# Patient Record
Sex: MALE | Race: WHITE | NOT HISPANIC OR LATINO | Employment: FULL TIME | URBAN - METROPOLITAN AREA
[De-identification: names, ages, dates, MRNs, and addresses within clinical notes are randomized per-mention and may not be internally consistent; named-entity substitution may affect disease eponyms.]

---

## 2024-02-29 ENCOUNTER — OFFICE VISIT (OUTPATIENT)
Dept: URGENT CARE | Facility: CLINIC | Age: 34
End: 2024-02-29
Payer: COMMERCIAL

## 2024-02-29 VITALS
SYSTOLIC BLOOD PRESSURE: 129 MMHG | RESPIRATION RATE: 14 BRPM | DIASTOLIC BLOOD PRESSURE: 97 MMHG | OXYGEN SATURATION: 99 % | TEMPERATURE: 98.6 F | HEART RATE: 106 BPM

## 2024-02-29 DIAGNOSIS — B34.9 NONSPECIFIC SYNDROME SUGGESTIVE OF VIRAL ILLNESS: Primary | ICD-10-CM

## 2024-02-29 DIAGNOSIS — J10.1 INFLUENZA B: ICD-10-CM

## 2024-02-29 LAB
POC TRIPLEX FLU A-AG: ABNORMAL
POC TRIPLEX FLU B-AG: ABNORMAL
POC TRIPLEX SARSCOV-2 AG: ABNORMAL

## 2024-02-29 PROCEDURE — 87428 SARSCOV & INF VIR A&B AG IA: CPT | Performed by: PHYSICIAN ASSISTANT

## 2024-02-29 PROCEDURE — 99212 OFFICE O/P EST SF 10 MIN: CPT | Performed by: PHYSICIAN ASSISTANT

## 2024-02-29 RX ORDER — DEXTROMETHORPHAN HYDROBROMIDE, GUAIFENESIN AND PSEUDOEPHEDRINE HYDROCHLORIDE 15; 400; 60 MG/1; MG/1; MG/1
1 TABLET ORAL 3 TIMES DAILY
Qty: 21 TABLET | Refills: 0 | Status: SHIPPED | OUTPATIENT
Start: 2024-02-29 | End: 2024-03-07

## 2024-02-29 RX ORDER — OSELTAMIVIR PHOSPHATE 75 MG/1
75 CAPSULE ORAL 2 TIMES DAILY
Qty: 20 CAPSULE | Refills: 0 | Status: SHIPPED | OUTPATIENT
Start: 2024-02-29 | End: 2024-03-10

## 2024-02-29 NOTE — LETTER
February 29, 2024     Patient: Mikey Sarabia   YOB: 1990   Date of Visit: 2/29/2024       To Whom It May Concern:    It is my medical opinion that Mikey Sarabia may return to work on 03/04/24 .    If you have any questions or concerns, please don't hesitate to call.         Sincerely,        Alex Sun PA-C    CC: No Recipients   Never

## 2024-02-29 NOTE — PROGRESS NOTES
[facility] URGENT CARE [facility] NOTE:      Name: Mikey Sarabia, 33 y.o.    CSN:[id number]   MRN:[id number]    PCP: No primary care provider on file.    ALL:  No Known Allergies    History:    Chief Complaint: Nasal Congestion and HEAD PRESSURE (EARS PLUGGED X 3 DAYS)    Encounter Date: 2/29/2024  1250    HPI: The history was obtained from the patient. Mikey is a 33 y.o. male, who presents with a chief complaint of Nasal Congestion and HEAD PRESSURE (EARS PLUGGED X 3 DAYS) Patient reports nasal congesiton, post nasal drip, bilateral ear pressure/pain, generalized myalgias/arthralgias x 3 days. He also reports subjective fever/chills, diaphoresis, and decreased appetite resulting in a 7 lb weight loss in the last 3 days. No N/V/D. He denies any dyspnea or hemoptysis.       PMHx:    History reviewed. No pertinent past medical history.           Current Outpatient Medications   Medication Sig Dispense Refill    oseltamivir (Tamiflu) 75 mg capsule Take 1 capsule (75 mg) by mouth 2 times a day for 10 days. 20 capsule 0    pseudoephedrine-DM-guaifenesin (Capmist DM) 60- mg tablet Take 1 tablet by mouth 3 times a day for 7 days. 21 tablet 0     No current facility-administered medications for this visit.         PMSx:  History reviewed. No pertinent surgical history.    Fam Hx: No family history on file.    SOC. Hx:     Social History     Socioeconomic History    Marital status:      Spouse name: Not on file    Number of children: Not on file    Years of education: Not on file    Highest education level: Not on file   Occupational History    Not on file   Tobacco Use    Smoking status: Never    Smokeless tobacco: Never   Vaping Use    Vaping Use: Never used   Substance and Sexual Activity    Alcohol use: Never    Drug use: Never    Sexual activity: Never   Other Topics Concern    Not on file   Social History Narrative    Originally from [place]     Social Determinants of Health      Financial Resource Strain: Not on file   Food Insecurity: Not on file   Transportation Needs: Not on file   Physical Activity: Not on file   Stress: Not on file   Social Connections: Not on file   Intimate Partner Violence: Not on file   Housing Stability: Not on file         Vitals:    02/29/24 1232   BP: (!) 129/97   Pulse: 106   Resp: 14   Temp: 37 °C (98.6 °F)   SpO2: 99%              Physical Exam  Constitutional:       Appearance: Normal appearance.   HENT:      Head: Normocephalic and atraumatic.      Right Ear: Ear canal and external ear normal.      Left Ear: Ear canal and external ear normal.      Ears:      Comments: Air fluid levels bilaterally, bilateral TM patent without erythema or retraction     Mouth/Throat:      Mouth: Mucous membranes are moist.      Pharynx: Oropharynx is clear. No oropharyngeal exudate or posterior oropharyngeal erythema.   Eyes:      General: No scleral icterus.        Right eye: No discharge.         Left eye: No discharge.      Extraocular Movements: Extraocular movements intact.      Conjunctiva/sclera: Conjunctivae normal.      Pupils: Pupils are equal, round, and reactive to light.   Cardiovascular:      Rate and Rhythm: Normal rate and regular rhythm.      Pulses: Normal pulses.      Heart sounds: Normal heart sounds. No murmur heard.     No friction rub. No gallop.   Pulmonary:      Effort: Pulmonary effort is normal. No respiratory distress.      Breath sounds: Normal breath sounds. No stridor. No wheezing, rhonchi or rales.   Musculoskeletal:         General: Normal range of motion.      Cervical back: Normal range of motion and neck supple. No rigidity.   Lymphadenopathy:      Cervical: No cervical adenopathy.   Skin:     General: Skin is warm and dry.      Coloration: Skin is not jaundiced.   Neurological:      General: No focal deficit present.      Mental Status: He is alert and oriented to person, place, and time.   Psychiatric:         Mood and Affect: Mood  normal.         Behavior: Behavior normal.       LABORATORY @ RADIOLOGICAL IMAGING (if done):     Results for orders placed or performed in visit on 02/29/24 (from the past 24 hour(s))   POCT BD Veritor Triplex Ag   Result Value Ref Range    POC Triplex SARS-CoV-2 Ag  Presumptive negative for Triplex SARS-CoV-2 (no antigen detected)     Presumptive negative for Triplex SARS-CoV-2 (no antigen detected)    POC Triplex Flu A-Ag  Presumptive negative for Triplex FLU A (no antigen detected)     Presumptive negative for Triplex FLU A (no antigen detected)    POC Triplex Flu B-Ag (A) Presumptive negative for Triplex FLU B (no antigen detected)     Positive test for Triplex Flu B Ag (Flu B antigen detected)        COURSE/MEDICAL DECISION MAKING:    Mikey is a 33 y.o., who presents with a working diagnosis of   1. Nonspecific syndrome suggestive of viral illness    2. Influenza B     with a differential to include: influenza, covid, strep pharyngitis    Given patient's symptoms a multiplex viral PCR was performed which is positive for influenza B.  Current plan is to provide Tamiflu, supportive care, patient agrees with plan of discharge and given a note for work.    Note initiated by:  ELLIE Prakash, [facility]    Supervised by  Alex Sun PA-C   Advanced Practice Provider  [facility]    I was present with the PA student who participated in the documentation of this note. I have personally seen and re-examined the patient and performed the medical decision-making components (assessment and plan of care). I have reviewed the PA student documentation and verified the findings in the note as written with additions or exceptions as stated in the body of this note.    Alex Sun PA-C

## 2024-05-16 ENCOUNTER — OFFICE VISIT (OUTPATIENT)
Dept: URGENT CARE | Facility: CLINIC | Age: 34
End: 2024-05-16
Payer: COMMERCIAL

## 2024-05-16 VITALS
HEIGHT: 71 IN | BODY MASS INDEX: 25.2 KG/M2 | TEMPERATURE: 98.2 F | WEIGHT: 180 LBS | HEART RATE: 88 BPM | SYSTOLIC BLOOD PRESSURE: 151 MMHG | DIASTOLIC BLOOD PRESSURE: 97 MMHG | OXYGEN SATURATION: 98 % | RESPIRATION RATE: 17 BRPM

## 2024-05-16 DIAGNOSIS — H00.014 HORDEOLUM EXTERNUM LEFT UPPER EYELID: Primary | ICD-10-CM

## 2024-05-16 PROCEDURE — 99213 OFFICE O/P EST LOW 20 MIN: CPT | Performed by: NURSE PRACTITIONER

## 2024-05-16 RX ORDER — ERYTHROMYCIN 5 MG/G
OINTMENT OPHTHALMIC 2 TIMES DAILY
Qty: 3.5 G | Refills: 0 | Status: SHIPPED | OUTPATIENT
Start: 2024-05-16 | End: 2024-05-23

## 2024-05-16 NOTE — PROGRESS NOTES
34 y.o. male presents for evaluation of left upper eye lid stye that began a few weeks ago. Denies drainage from eye, eye redness, visual changes, head or any other associated symptoms. No otc meds for symptoms. No other complaints.      Vitals:    05/16/24 1047   BP: (!) 151/97   Pulse: 88   Resp: 17   Temp: 36.8 °C (98.2 °F)   SpO2: 98%       No Known Allergies    Medication Documentation Review Audit       Reviewed by Chandra Lang MA (Medical Assistant) on 05/16/24 at 1047      Medication Order Taking? Sig Documenting Provider Last Dose Status            No Medications to Display                                   No past medical history on file.    No past surgical history on file.    ROS  See HPI    Physical Exam  Vitals and nursing note reviewed.   Constitutional:       Appearance: Normal appearance.   Eyes:      General:         Left eye: Hordeolum (upper) present.     Conjunctiva/sclera: Conjunctivae normal.      Pupils: Pupils are equal, round, and reactive to light.     Cardiovascular:      Rate and Rhythm: Normal rate and regular rhythm.   Skin:     General: Skin is warm and dry.   Neurological:      General: No focal deficit present.      Mental Status: He is alert and oriented to person, place, and time.   Psychiatric:         Mood and Affect: Mood normal.         Behavior: Behavior normal.         Thought Content: Thought content normal.         Judgment: Judgment normal.           Assessment/Plan/MDM  Mikey was seen today for eye pain.  Diagnoses and all orders for this visit:  Hordeolum externum left upper eyelid (Primary)  -     erythromycin (Romycin) 5 mg/gram (0.5 %) ophthalmic ointment; Apply to left eye 2 times a day for 7 days. Apply Amount per Dose: 0.25 inch (~0.5 cm) per dose.    Encouraged cool compresses and otc antihistamine. Patient's clinical presentation is otherwise unremarkable at this time. Patient is discharged with instructions to follow-up with primary care or seek emergency  medical attention for worsening symptoms or any new concerns.    I did personally review Mikey's past medical history, surgical history, social history, as well as family history (when relevant).  In this case, I also oversaw the his drug management by reviewing his medication list, allergy list, as well as the medications that I prescribed during the UC course and/or recommended as an out-patient (including possible OTC medications such as acetaminophen, NSAIDs , etc).    After reviewing the items above, I did not look at previous medical documentation, such as recent hospitalizations, office visits, and/or recent consultations with PCP/specialist.                          SDOH:   Another factor that I considered in Mikey's care was his Social Determinants of Health (SDOH). During this UC encounter, he did not have social determinants of health. Those SDOH influencing Mikey's care are: none      Manas Bagley CNP  [facility]  [phone]

## 2024-05-21 ENCOUNTER — OFFICE VISIT (OUTPATIENT)
Dept: URGENT CARE | Facility: CLINIC | Age: 34
End: 2024-05-21
Payer: COMMERCIAL

## 2024-05-21 VITALS
DIASTOLIC BLOOD PRESSURE: 91 MMHG | SYSTOLIC BLOOD PRESSURE: 141 MMHG | RESPIRATION RATE: 14 BRPM | OXYGEN SATURATION: 98 % | TEMPERATURE: 98 F | HEART RATE: 79 BPM

## 2024-05-21 DIAGNOSIS — H57.9 EYE LESION: Primary | ICD-10-CM

## 2024-05-21 PROCEDURE — 99212 OFFICE O/P EST SF 10 MIN: CPT

## 2024-05-21 NOTE — PROGRESS NOTES
[facility] URGENT CARE [facility] NOTE:      Name: Mikey Sarabia, 34 y.o.    CSN:[id number]   MRN:[id number]    PCP: No primary care provider on file.    ALL:  No Known Allergies    History:    Chief Complaint: L eyelid (Swelling x 3 weeks)    Encounter Date: 5/21/2024      HPI: The history was obtained from the patient. Mikey is a 34 y.o. male, who presents with a chief complaint of L eyelid (Swelling x 3 weeks).  Patient was seen at urgent care on 5/16/2024 and was diagnosed with hordeolum and was given erythromycin ointment.  Patient states he is on day 6 of 7 of this ointment with no improvement.  Has been using warm compresses as well.  He states the eye is not painful.  He denies fevers, nausea, vomiting, headaches, changes in his vision, pain with eye movement, sore throat, chest pain, shortness of breath.      PMHx:    No past medical history on file.           Current Outpatient Medications   Medication Sig Dispense Refill    erythromycin (Romycin) 5 mg/gram (0.5 %) ophthalmic ointment Apply to left eye 2 times a day for 7 days. Apply Amount per Dose: 0.25 inch (~0.5 cm) per dose. 3.5 g 0     No current facility-administered medications for this visit.         PMSx:  No past surgical history on file.    Fam Hx: No family history on file.    SOC. Hx:     Social History     Socioeconomic History    Marital status:      Spouse name: Not on file    Number of children: Not on file    Years of education: Not on file    Highest education level: Not on file   Occupational History    Not on file   Tobacco Use    Smoking status: Never    Smokeless tobacco: Never   Vaping Use    Vaping status: Never Used   Substance and Sexual Activity    Alcohol use: Never    Drug use: Never    Sexual activity: Never   Other Topics Concern    Not on file   Social History Narrative    Originally from [place]     Social Determinants of Health     Financial Resource Strain: Not on file   Food Insecurity:  Not on file   Transportation Needs: Not on file   Physical Activity: Not on file   Stress: Not on file   Social Connections: Not on file   Intimate Partner Violence: Not on file   Housing Stability: Not on file         Vitals:    05/21/24 1155   BP: (!) 141/91   Pulse: 79   Resp: 14   Temp: 36.7 °C (98 °F)   SpO2: 98%                Physical Exam  Vitals reviewed.   Constitutional:       Appearance: Normal appearance.   HENT:      Right Ear: Tympanic membrane normal.      Left Ear: Tympanic membrane normal.      Nose: Nose normal.      Mouth/Throat:      Mouth: Mucous membranes are moist.      Pharynx: Oropharynx is clear.   Eyes:      Extraocular Movements: Extraocular movements intact.      Right eye: Normal extraocular motion.      Left eye: Normal extraocular motion.      Conjunctiva/sclera: Conjunctivae normal.      Pupils: Pupils are equal, round, and reactive to light.        Comments: Lesion to the external left eyelid.  Eyelid does appear to be erythematic.  No pain noted.  No pain with extraocular movement   Cardiovascular:      Rate and Rhythm: Normal rate and regular rhythm.      Pulses: Normal pulses.      Heart sounds: Normal heart sounds.   Pulmonary:      Effort: Pulmonary effort is normal.      Breath sounds: Normal breath sounds.   Skin:     General: Skin is warm.   Neurological:      General: No focal deficit present.      Mental Status: He is alert and oriented to person, place, and time.   Psychiatric:         Mood and Affect: Mood normal.         Behavior: Behavior normal.       I did personally review Mikey's past medical history, surgical history, social history, as well as family history (when relevant).  In this case, I also oversaw the his drug management by reviewing his medication list, allergy list, as well as the medications that I prescribed during the UC course and/or recommended as an out-patient (including possible OTC medications such as acetaminophen, NSAIDs , etc).    After  "reviewing the items above, I did look at previous medical documentation, such as recent hospitalizations, office visits, and/or recent consultations with PCP/specialist.                          SDOH:   Another factor that I considered in Mikey's care was his Social Determinants of Health (SDOH). During this UC encounter, he did not have social determinants of health. Those SDOH influencing Mikey's care are: none    LABORATORY @ RADIOLOGICAL IMAGING (if done):     No results found for this or any previous visit (from the past 24 hour(s)).    UC COURSE/MEDICAL DECISION MAKING:    Mikey is a 34 y.o., who presents with a working diagnosis of   1. Eye lesion      Mikey was seen today for l eyelid.  Diagnoses and all orders for this visit:  Eye lesion (Primary)  -     Referral to Ophthalmology; Future    Discussed with patient that since symptoms have been persistent for almost 1 month with no improvement with antibiotics and symptomatic management that he should be seen by ophthalmology.  I called [facility] and was able to schedule him an appointment for today at 1 PM.  Patient will head to [facility] immediately.  Discussed with patient if his condition worsens in any way or any new symptoms arise he should report to the ER immediately.  Patient was agreeable to plan.    Payal Villanueva PA-C   Advanced Practice Provider  [facility]    Please note: Portions of this chart may have been created with Dragon voice recognition software. Occasional wrong-word or \"sound-like\" substitutions may have occurred due to inherent limitations of the voice recognition software. Please excuse any typographical or grammatical errors contained herein. Please read the chart carefully and recognize, using context, where the substitutions have occurred.   "